# Patient Record
Sex: FEMALE | Race: BLACK OR AFRICAN AMERICAN | Employment: OTHER | URBAN - METROPOLITAN AREA
[De-identification: names, ages, dates, MRNs, and addresses within clinical notes are randomized per-mention and may not be internally consistent; named-entity substitution may affect disease eponyms.]

---

## 2017-09-24 ENCOUNTER — APPOINTMENT (OUTPATIENT)
Dept: CT IMAGING | Facility: CLINIC | Age: 42
End: 2017-09-24
Attending: EMERGENCY MEDICINE
Payer: COMMERCIAL

## 2017-09-24 ENCOUNTER — HOSPITAL ENCOUNTER (EMERGENCY)
Facility: CLINIC | Age: 42
Discharge: HOME | End: 2017-09-24
Attending: EMERGENCY MEDICINE | Admitting: EMERGENCY MEDICINE
Payer: COMMERCIAL

## 2017-09-24 VITALS
TEMPERATURE: 98 F | RESPIRATION RATE: 18 BRPM | DIASTOLIC BLOOD PRESSURE: 85 MMHG | OXYGEN SATURATION: 97 % | SYSTOLIC BLOOD PRESSURE: 125 MMHG

## 2017-09-24 DIAGNOSIS — R07.0 THROAT PAIN: ICD-10-CM

## 2017-09-24 DIAGNOSIS — R07.89 ATYPICAL CHEST PAIN: ICD-10-CM

## 2017-09-24 PROBLEM — R10.2 CHRONIC PELVIC PAIN IN FEMALE: Status: ACTIVE | Noted: 2017-03-06

## 2017-09-24 PROBLEM — G89.29 CHRONIC PELVIC PAIN IN FEMALE: Status: ACTIVE | Noted: 2017-03-06

## 2017-09-24 PROBLEM — N97.9 FEMALE INFERTILITY: Status: ACTIVE | Noted: 2017-02-28

## 2017-09-24 LAB
ANION GAP SERPL CALCULATED.3IONS-SCNC: 5 MMOL/L (ref 3–14)
BASOPHILS # BLD AUTO: 0 10E9/L (ref 0–0.2)
BASOPHILS NFR BLD AUTO: 0.2 %
BUN SERPL-MCNC: 10 MG/DL (ref 7–30)
CALCIUM SERPL-MCNC: 8.4 MG/DL (ref 8.5–10.1)
CHLORIDE SERPL-SCNC: 106 MMOL/L (ref 94–109)
CO2 SERPL-SCNC: 26 MMOL/L (ref 20–32)
CREAT SERPL-MCNC: 0.54 MG/DL (ref 0.52–1.04)
D DIMER PPP FEU-MCNC: 0.4 UG/ML FEU (ref 0–0.5)
DIFFERENTIAL METHOD BLD: NORMAL
EOSINOPHIL # BLD AUTO: 0 10E9/L (ref 0–0.7)
EOSINOPHIL NFR BLD AUTO: 0 %
ERYTHROCYTE [DISTWIDTH] IN BLOOD BY AUTOMATED COUNT: 12.7 % (ref 10–15)
GFR SERPL CREATININE-BSD FRML MDRD: >90 ML/MIN/1.7M2
GLUCOSE SERPL-MCNC: 146 MG/DL (ref 70–99)
HCT VFR BLD AUTO: 38.8 % (ref 35–47)
HGB BLD-MCNC: 12.9 G/DL (ref 11.7–15.7)
IMM GRANULOCYTES # BLD: 0 10E9/L (ref 0–0.4)
IMM GRANULOCYTES NFR BLD: 0.6 %
LYMPHOCYTES # BLD AUTO: 0.9 10E9/L (ref 0.8–5.3)
LYMPHOCYTES NFR BLD AUTO: 13.1 %
MCH RBC QN AUTO: 31.2 PG (ref 26.5–33)
MCHC RBC AUTO-ENTMCNC: 33.2 G/DL (ref 31.5–36.5)
MCV RBC AUTO: 94 FL (ref 78–100)
MONOCYTES # BLD AUTO: 0.1 10E9/L (ref 0–1.3)
MONOCYTES NFR BLD AUTO: 1.8 %
NEUTROPHILS # BLD AUTO: 5.5 10E9/L (ref 1.6–8.3)
NEUTROPHILS NFR BLD AUTO: 84.3 %
NRBC # BLD AUTO: 0 10*3/UL
NRBC BLD AUTO-RTO: 0 /100
PLATELET # BLD AUTO: 191 10E9/L (ref 150–450)
POTASSIUM SERPL-SCNC: 3.7 MMOL/L (ref 3.4–5.3)
RBC # BLD AUTO: 4.14 10E12/L (ref 3.8–5.2)
SODIUM SERPL-SCNC: 137 MMOL/L (ref 133–144)
TROPONIN I SERPL-MCNC: <0.015 UG/L (ref 0–0.04)
WBC # BLD AUTO: 6.5 10E9/L (ref 4–11)

## 2017-09-24 PROCEDURE — 99285 EMERGENCY DEPT VISIT HI MDM: CPT | Mod: 25

## 2017-09-24 PROCEDURE — 96361 HYDRATE IV INFUSION ADD-ON: CPT

## 2017-09-24 PROCEDURE — 84484 ASSAY OF TROPONIN QUANT: CPT | Performed by: EMERGENCY MEDICINE

## 2017-09-24 PROCEDURE — 85379 FIBRIN DEGRADATION QUANT: CPT | Performed by: EMERGENCY MEDICINE

## 2017-09-24 PROCEDURE — 25000130 H RX MED GY IP 250 OP 259 PS 637: Performed by: EMERGENCY MEDICINE

## 2017-09-24 PROCEDURE — 71260 CT THORAX DX C+: CPT

## 2017-09-24 PROCEDURE — 85025 COMPLETE CBC W/AUTO DIFF WBC: CPT | Performed by: EMERGENCY MEDICINE

## 2017-09-24 PROCEDURE — 25000128 H RX IP 250 OP 636: Performed by: EMERGENCY MEDICINE

## 2017-09-24 PROCEDURE — 80048 BASIC METABOLIC PNL TOTAL CA: CPT | Performed by: EMERGENCY MEDICINE

## 2017-09-24 PROCEDURE — 96374 THER/PROPH/DIAG INJ IV PUSH: CPT | Mod: 59

## 2017-09-24 PROCEDURE — 93005 ELECTROCARDIOGRAM TRACING: CPT

## 2017-09-24 RX ORDER — IOPAMIDOL 755 MG/ML
500 INJECTION, SOLUTION INTRAVASCULAR ONCE
Status: COMPLETED | OUTPATIENT
Start: 2017-09-24 | End: 2017-09-24

## 2017-09-24 RX ORDER — SODIUM CHLORIDE 9 MG/ML
1000 INJECTION, SOLUTION INTRAVENOUS CONTINUOUS
Status: DISCONTINUED | OUTPATIENT
Start: 2017-09-24 | End: 2017-09-25 | Stop reason: HOSPADM

## 2017-09-24 RX ORDER — IBUPROFEN 200 MG
400 TABLET ORAL EVERY 8 HOURS PRN
Qty: 60 TABLET | Refills: 0 | Status: SHIPPED | OUTPATIENT
Start: 2017-09-24 | End: 2018-03-13

## 2017-09-24 RX ORDER — SODIUM CHLORIDE 9 MG/ML
INJECTION, SOLUTION INTRAVENOUS
Status: DISCONTINUED
Start: 2017-09-24 | End: 2017-09-25 | Stop reason: HOSPADM

## 2017-09-24 RX ORDER — KETOROLAC TROMETHAMINE 15 MG/ML
15 INJECTION, SOLUTION INTRAMUSCULAR; INTRAVENOUS ONCE
Status: COMPLETED | OUTPATIENT
Start: 2017-09-24 | End: 2017-09-24

## 2017-09-24 RX ADMIN — SODIUM CHLORIDE 1000 ML: 9 INJECTION, SOLUTION INTRAVENOUS at 21:08

## 2017-09-24 RX ADMIN — KETOROLAC TROMETHAMINE 15 MG: 15 INJECTION, SOLUTION INTRAMUSCULAR; INTRAVENOUS at 21:18

## 2017-09-24 RX ADMIN — SODIUM CHLORIDE 80 ML: 9 INJECTION, SOLUTION INTRAVENOUS at 22:12

## 2017-09-24 RX ADMIN — Medication 10 MG: at 23:20

## 2017-09-24 RX ADMIN — IOPAMIDOL 66 ML: 755 INJECTION, SOLUTION INTRAVENOUS at 22:12

## 2017-09-24 ASSESSMENT — ENCOUNTER SYMPTOMS
CHEST TIGHTNESS: 1
SHORTNESS OF BREATH: 1
APPETITE CHANGE: 1
SORE THROAT: 1
FEVER: 0
VOICE CHANGE: 1
ABDOMINAL PAIN: 1
TROUBLE SWALLOWING: 1

## 2017-09-24 NOTE — ED AVS SNAPSHOT
[facility] Emergency Department    [address] [facility] [address]    Phone:  [phone]    Fax:  [phone]                                       Pili Du   MRN: [id number]    Department:  [facility] Emergency Department   Date of Visit:  9/24/2017           Patient Information     Date Of Birth          1975        Your diagnoses for this visit were:     Atypical chest pain     Throat pain        You were seen by Diana La MD.      Follow-up Information     Follow up with [facility] Emergency Department.    Specialty:  EMERGENCY MEDICINE    Why:  immediately , If symptoms worsen    Contact information:    [address]  [facility] [address] [phone]        Follow up with Colin, Tito Berrios In 3 days.    Why:  for a recheck of your symptoms and your blood sugar (slightly elevated in the ED)    Contact information:    [address] [place] [address]  [phone]          Discharge Instructions       Discharge Instructions  Chest Pain    You have been seen today for chest pain or discomfort.  At this time, your doctor has found no signs that your chest pain is due to a serious or life-threatening condition, (or you have declined more testing and/or admission to the hospital). However, sometimes there is a serious problem that does not show up right away. Your evaluation today may not be complete and you may need further testing and evaluation.     You need to follow-up with your regular doctor within 3 days.    Return to the Emergency Department if:    Your chest pain changes, gets worse, starts to happen more often, or comes with less activity.    You are short of breath.    You get very weak or tired.    You pass out or faint.    You have any new symptoms, like fever, cough, numb legs, or you cough up blood.    You have anything else that worries you.    Until you follow-up with your  regular doctor please do the following:    Take one aspirin daily unless you have an allergy or are told not to by your doctor.    If a stress test appointment has been made, go to the appointment.    If you have questions, contact your regular doctor.    If your doctor today has told you to follow-up with your regular doctor, it is very important that you make an appointment with your clinic and go to the appointment.  If you do not follow-up with your primary doctor, it may result in missing an important development which could result in permanent injury or disability and/or lasting pain.  If there is any problem keeping your appointment, call your doctor or return to the Emergency Department.    If you were given a prescription for medicine here today, be sure to read all of the information (including the package insert) that comes with your prescription.  This will include important information about the medicine, its side effects, and any warnings that you need to know about.  The pharmacist who fills the prescription can provide more information and answer questions you may have about the medicine.  If you have questions or concerns that the pharmacist cannot address, please call or return to the Emergency Department.         24 Hour Appointment Hotline       To make an appointment at any [facility], call [phone] ([phone]). If you don't have a family doctor or clinic, we will help you find one. [place] clinics are conveniently located to serve the needs of you and your family.             Review of your medicines      Our records show that you are taking the medicines listed below. If these are incorrect, please call your family doctor or clinic.        Dose / Directions Last dose taken    cholecalciferol 5000 UNITS Caps   Dose:  1 capsule   Quantity:  100 capsule        Take 1 capsule (5,000 Units) by mouth daily FOR VITAMIN D DEFICIENCY (LOW VITAMIN D),TAKE 2 CAPSULES DAILY FOR THE FIRST  2 WEEKS, THEN 1 CAPSULE DAILY   Refills:  3        ferrous sulfate Dried 140 (45 FE) MG Tbcr   Dose:  1 tablet   Quantity:  180 tablet        Take 1 tablet by mouth 2 times daily (before meals) IRON SUPPLEMENT - PLEASE TAKE WITH 4 OZ OF ORANGE JUICE WITH PULP, SUBSTITUTION OF IRON SUPPLEMENT PERMITTED   Refills:  PRN        ibuprofen 600 MG tablet   Commonly known as:  ADVIL/MOTRIN   Dose:  600 mg   Quantity:  30 tablet        Take 1 tablet (600 mg) by mouth every 6 hours as needed for moderate pain   Refills:  0        SYNTHROID PO   Dose:  150 mcg        Take 150 mcg by mouth daily   Refills:  0        vitamin C-electrolytes 1000mg vitamin C super orange drink mix   Commonly known as:  EMERGEN-C   Quantity:  90 packet        Mix 1 packet in 4-6oz water and take once daily take with iron supplement, INDICATION: VITAMIN C SUPPLEMENTION   Refills:  PRN                Procedures and tests performed during your visit     Basic metabolic panel    CBC with platelets differential    Chest CT, IV contrast only - PE protocol    D dimer quantitative    EKG 12-lead, tracing only    Peripheral IV: Standard    Troponin I      Orders Needing Specimen Collection     None      Pending Results     Date and Time Order Name Status Description    9/24/2017 2044 EKG 12-lead, tracing only Preliminary             Pending Culture Results     No orders found from 9/22/2017 to 9/25/2017.            Pending Results Instructions     If you had any lab results that were not finalized at the time of your Discharge, you can call the ED Lab Result RN at [phone]. You will be contacted by this team for any positive Lab results or changes in treatment. The nurses are available 7 days a week from 10A to 6:30P.  You can leave a message 24 hours per day and they will return your call.        Test Results From Your Hospital Stay        9/24/2017  9:29 PM      Component Results     Component Value Ref Range & Units Status    WBC 6.5 4.0 - 11.0  10e9/L Final    RBC Count 4.14 3.8 - 5.2 10e12/L Final    Hemoglobin 12.9 11.7 - 15.7 g/dL Final    Hematocrit 38.8 35.0 - 47.0 % Final    MCV 94 78 - 100 fl Final    MCH 31.2 26.5 - 33.0 pg Final    MCHC 33.2 31.5 - 36.5 g/dL Final    RDW 12.7 10.0 - 15.0 % Final    Platelet Count 191 150 - 450 10e9/L Final    Diff Method Automated Method  Final    % Neutrophils 84.3 % Final    % Lymphocytes 13.1 % Final    % Monocytes 1.8 % Final    % Eosinophils 0.0 % Final    % Basophils 0.2 % Final    % Immature Granulocytes 0.6 % Final    Nucleated RBCs 0 0 /100 Final    Absolute Neutrophil 5.5 1.6 - 8.3 10e9/L Final    Absolute Lymphocytes 0.9 0.8 - 5.3 10e9/L Final    Absolute Monocytes 0.1 0.0 - 1.3 10e9/L Final    Absolute Eosinophils 0.0 0.0 - 0.7 10e9/L Final    Absolute Basophils 0.0 0.0 - 0.2 10e9/L Final    Abs Immature Granulocytes 0.0 0 - 0.4 10e9/L Final    Absolute Nucleated RBC 0.0  Final         9/24/2017  9:50 PM      Component Results     Component Value Ref Range & Units Status    Sodium 137 133 - 144 mmol/L Final    Potassium 3.7 3.4 - 5.3 mmol/L Final    Chloride 106 94 - 109 mmol/L Final    Carbon Dioxide 26 20 - 32 mmol/L Final    Anion Gap 5 3 - 14 mmol/L Final    Glucose 146 (H) 70 - 99 mg/dL Final    Urea Nitrogen 10 7 - 30 mg/dL Final    Creatinine 0.54 0.52 - 1.04 mg/dL Final    GFR Estimate >90 >60 mL/min/1.7m2 Final    Non  GFR Calc    GFR Estimate If Black >90 >60 mL/min/1.7m2 Final    African American GFR Calc    Calcium 8.4 (L) 8.5 - 10.1 mg/dL Final         9/24/2017  9:43 PM      Component Results     Component Value Ref Range & Units Status    D Dimer 0.4 0.0 - 0.50 ug/ml FEU Final    This D-dimer assay is intended for use in conjunction with a clinical pretest   probability assessment model to exclude pulmonary embolism (PE) and deep   venous thrombosis (DVT) in outpatients suspected of PE or DVT. The cut-off   value is 0.5 ug/mL FEU.           9/24/2017  9:50 PM       Component Results     Component Value Ref Range & Units Status    Troponin I ES <0.015 0.000 - 0.045 ug/L Final    The 99th percentile for upper reference range is 0.045 ug/L.  Troponin values   in the range of 0.045 - 0.120 ug/L may be associated with risks of adverse   clinical events.           9/24/2017 10:49 PM      Narrative     CT CHEST PULMONARY EMBOLISM WITH CONTRAST 9/24/2017 10:21 PM     HISTORY: Right-sided chest pain, shortness of breath, forceful  vomiting 3 days ago.    CONTRAST DOSE:  66 mL Isovue-370    Radiation dose for this scan was reduced using automated exposure  control, adjustment of the mA and/or kV according to patient size, or  iterative reconstruction technique.    FINDINGS:  There is a good contrast bolus within the pulmonary  arteries. There are no pulmonary arterial filling defects to indicate  pulmonary embolism. Contrast enhancement within the aorta is  suboptimal with only a small amount of contrast present. Nonetheless,  there is no apparent aortic dissection. The mediastinum and mateo are  otherwise unremarkable. There is a 0.2 cm right lower lobe pulmonary  nodule on image 70, unchanged from 7/26/2012 and therefore benign. The  lungs are otherwise clear. No pleural effusion or pneumothorax.  Cholecystectomy surgical clips are noted.        Impression     IMPRESSION: No CT evidence of pulmonary embolism.    CATRINA GARCIA MD                Clinical Quality Measure: Blood Pressure Screening     Your blood pressure was checked while you were in the emergency department today. The last reading we obtained was  BP: (!) 124/92 . Please read the guidelines below about what these numbers mean and what you should do about them.  If your systolic blood pressure (the top number) is less than 120 and your diastolic blood pressure (the bottom number) is less than 80, then your blood pressure is normal. There is nothing more that you need to do about it.  If your systolic blood pressure (the  "top number) is 120-139 or your diastolic blood pressure (the bottom number) is 80-89, your blood pressure may be higher than it should be. You should have your blood pressure rechecked within a year by a primary care provider.  If your systolic blood pressure (the top number) is 140 or greater or your diastolic blood pressure (the bottom number) is 90 or greater, you may have high blood pressure. High blood pressure is treatable, but if left untreated over time it can put you at risk for heart attack, stroke, or kidney failure. You should have your blood pressure rechecked by a primary care provider within the next 4 weeks.  If your provider in the emergency department today gave you specific instructions to follow-up with your doctor or provider even sooner than that, you should follow that instruction and not wait for up to 4 weeks for your follow-up visit.        Thank you for choosing [facility]       Thank you for choosing [facility] for your care. Our goal is always to provide you with excellent care. Hearing back from our patients is one way we can continue to improve our services. Please take a few minutes to complete the written survey that you may receive in the mail after you visit with us. Thank you!        [employer] Information     [employer] lets you send messages to your doctor, view your test results, renew your prescriptions, schedule appointments and more. To sign up, go to [email or web address] . Click on \"Log in\" on the left side of the screen, which will take you to the Welcome page. Then click on \"Sign up Now\" on the right side of the page.     You will be asked to enter the access code listed below, as well as some personal information. Please follow the directions to create your username and password.     Your access code is: [email or web address]  Expires: 2017 11:24 PM     Your access code will  in 90 days. If you need help or a new code, please call your [facility] clinic or [phone].      "   Care EveryWhere ID     This is your Care EveryWhere ID. This could be used by other organizations to access your [facility] medical records  [id number]        Equal Access to Services     TALA ALMODOVAR : Johnathan Fontenot, cas sahu, shweta matthews, mercedes goldstein. So [facility] [phone].    ATENCIÓN: Si habla español, tiene a blum disposición servicios gratuitos de asistencia lingüística. Llame al [phone].    We comply with applicable federal civil rights laws and [place] laws. We do not discriminate on the basis of race, color, national origin, age, disability sex, sexual orientation or gender identity.            After Visit Summary       This is your record. Keep this with you and show to your community pharmacist(s) and doctor(s) at your next visit.

## 2017-09-24 NOTE — ED AVS SNAPSHOT
[facility] Emergency Department    [address]    [facility] [address]    Phone:  [phone]    Fax:  [phone]                                       Pili Du   MRN: [id number]    Department:  [facility] Emergency Department   Date of Visit:  9/24/2017           After Visit Summary Signature Page     I have received my discharge instructions, and my questions have been answered. I have discussed any challenges I see with this plan with the nurse or doctor.    ..........................................................................................................................................  Patient/Patient Representative Signature      ..........................................................................................................................................  Patient Representative Print Name and Relationship to Patient    ..................................................               ................................................  Date                                            Time    ..........................................................................................................................................  Reviewed by Signature/Title    ...................................................              ..............................................  Date                                                            Time

## 2017-09-25 LAB — INTERPRETATION ECG - MUSE: NORMAL

## 2017-09-25 NOTE — ED NOTES
Patient reports feeling SOB and having some tightness in her upper right chest and throat. Patient states she had heartburn 2 days ago and made herself vomit. Patient states that she was seen at urgent care yesterday and received a breathing treatment which didn't help.

## 2017-09-25 NOTE — ED PROVIDER NOTES
"  History     Chief Complaint:  Shortness of Breath      HPI   Pili Du is a 42 year old female who presents to the emergency department today for evaluation of shortness of breath. The patient reports that 4 days ago after eating breakfast she had an episode of vomiting followed by chest and throat tightness. She tried to burp but was unable and took gas medication without relief. Since then, she has felt \"heavy\" in her throat and right side of chest, therefore she went to urgent care yesterday and received an chest radiograph and neb with no relief.  She describes her chest pain as \"tightness.\"  The chest radiograph was reportedly negative, and strep swab was also negative.  Today, symptoms are persistent, therefore she presents to the emergency department for evaluation. Associated symptoms included shortness of breath, decreased appetite, abdominal pain, and voice change.  She denies pain with swallowing. There are no aggravating factors. She denies fever and pain with swallowing. Of note, she took ibuprofen four hours prior to arrival with little relief.    Allergies:  [place] aspirin     Medications:    cholecalciferol 5000 UNITS CAPS  vitamin C-electrolytes (EMERGEN-C) 1000mg vitamin C super orange drink mix  ferrous sulfate Dried 140 (45 FE) MG TBCR  Levothyroxine Sodium (SYNTHROID PO)    Past Medical History:    Abnormal Pap smear  Anemia  Hypothyroidism  Endometriosis  GERD    Past Surgical History:    Laparoscopic cholecystectomy    Family History:    History reviewed. No pertinent family history.     Social History:  The patient was unaccompanied to the ED.  Smoking Status: never  Smokeless Tobacco: never  Alcohol Use: no  Marital Status:       Review of Systems   Constitutional: Positive for appetite change (decreased). Negative for fever.   HENT: Positive for sore throat (throat tightness), trouble swallowing (denies pain) and voice change.    Respiratory: Positive for chest tightness and " shortness of breath.    Gastrointestinal: Positive for abdominal pain.   All other systems reviewed and are negative.    Physical Exam   First Vitals:  BP: (!) 130/97  Heart Rate: 99  Temp: 98  F (36.7  C)  Resp: 20  SpO2: 100 %      Physical Exam  Gen: Pleasant, appears stated age.  Well appearing    Eye:   Pupils are equal, round, and reactive.     Sclera non-injected.    ENT:   Moist mucus membranes.     Normal tongue.    Oropharynx without lesions.  Posterior OP slightly erythematous, no exudate.   Voice slightly hoarse.   Mild pain with compression of tracheal cartilage.   No drooling or stridor.   Full flexion and extension of the neck.   No anterior lymphadenopathy.   Mild tenderness lateral to trachea.  No masses.   Uvula midline.    Cardiac:     Normal rate and regular rhythm.    No murmurs, gallops, or rubs.    Pulmonary:     Clear to auscultation bilaterally.    No wheezes, rales, or rhonchi.   Breathing unlabored when lying at 20 degrees.    Abdomen:     Normal active bowel sounds.     Abdomen is soft and non-distended.   Mild diffuse abdominal pain.    Musculoskeletal:     Normal movement of all extremities without evidence for deficit.   Pain reproducible with palpation over right chest wall.    Extremities:    No edema.   2+ radial pulses.   Calves are nontender.    Skin:   Warm and dry.    Neurologic:    Non-focal exam without asymmetric weakness or numbness.    Normal tone    Psychiatric:     Normal affect with appropriate interaction with examiner.    Emergency Department Course     ECG:  Indication: shortness of breath   Completed at 2202.  Read at 2206.   Sinus bradycardia  Otherwise normal ECG   Rate 57 bpm. TX interval 152. QRS duration 80. QT/QTc 430/418. P-R-T axes 61 61 39.    Imaging:  Radiology findings were communicated with the patient who voiced understanding of the findings.  Chest CT, IV contrast only - PE protocol  IMPRESSION: No CT evidence of pulmonary embolism.  Report per  radiology     Laboratory:  Laboratory findings were communicated with the patient who voiced understanding of the findings.  CBC: AWNL. (WBC 6.5, HGB 12.9, )   BMP: Glucose 146 (H), Calcium 8.4 (L) o/w WNL (Creatinine 0.54)  D Dimer (Collected 2108): 0.4  Troponin (Collected 2108): <0.015    Interventions:  2110 NS Bolus 1,000mL IV  2118 Toradol 30 mg IV     Emergency Department Course:  Nursing notes and vitals reviewed.  IV was inserted and blood was drawn for laboratory testing, results above.  The patient was sent for a Chest CT, IV contrast only - PE protocol while in the emergency department, results above.   2037: I performed an exam of the patient as documented above.   2248: Patient rechecked and updated.  She continues to be well appearing without any increased work of breathing.  I discussed the treatment plan with the patient. They expressed understanding of this plan and consented to discharge. They will be discharged home with instructions for care and follow up. In addition, the patient will return to the emergency department if their symptoms persist, worsen, if new symptoms arise or if there is any concern.  All questions were answered.  I personally reviewed the laboratory and imaging results with the Patient and answered all related questions prior to discharge.    Impression & Plan      Medical Decision Making:  Pili Du is a 42 year old female who presents complaining of chest tightness and neck pain after induced vomiting 3 days ago. Symptoms have been constant for the last 3 days. Differential diagnosis was broad. Given the symptoms of tightness, I considered acute coronary syndrome. Fortunately, ECG is negative for any signs of acute ischemia. Troponin is negative after 3 days of constant pain. D dimer was negative, though CT of the chest was obtained. This was because I was also concerned about possible small esophageal rupture given that symptoms seemed to start following  induced vomiting. Fortunately, this is negative for any acute process including pulmonary embolism, aortic dissection, pneumonia, or other dangerous process. Her exam is not consistent with epiglottitis, peritonsillar abscess, retropharyngeal abscess, Dejan's angina, or Lemierre syndrome.  Airway is not compromised.  Decadron was provided for symptom relief.  Overall, she is well appearing with normal vitals and clear lungs. At this point, I think she is safe to be discharged home to continue symptomatic management. She will return to the emergency department for worsening pain, fever, or for other concerns.    Diagnosis:    ICD-10-CM    1. Atypical chest pain R07.89    2. Throat pain R07.0        Disposition:  Discharged to home    Scribe Disclosure:  I, Laure Bill, am serving as a scribe at 8:46 PM on 9/24/2017 to document services personally performed by Diana La MD based on my observations and the provider's statements to me.   9/24/2017   [facility] EMERGENCY DEPARTMENT       Diana La MD  09/25/17 4718

## 2017-12-12 ENCOUNTER — HOSPITAL ENCOUNTER (OUTPATIENT)
Dept: LAB | Facility: CLINIC | Age: 42
Discharge: HOME | End: 2017-12-12
Attending: OBSTETRICS & GYNECOLOGY | Admitting: OBSTETRICS & GYNECOLOGY
Payer: COMMERCIAL

## 2017-12-12 DIAGNOSIS — R53.81 DEBILITY: ICD-10-CM

## 2017-12-12 DIAGNOSIS — N94.10 FEMALE DYSPAREUNIA: ICD-10-CM

## 2017-12-12 DIAGNOSIS — Z13.29 SCREENING FOR THYROID DISORDER: ICD-10-CM

## 2017-12-12 DIAGNOSIS — R10.0 ACUTE ABDOMINAL PAIN SYNDROME: Primary | ICD-10-CM

## 2017-12-12 LAB
ALBUMIN SERPL-MCNC: 3.4 G/DL (ref 3.4–5)
ALP SERPL-CCNC: 98 U/L (ref 40–150)
ALT SERPL W P-5'-P-CCNC: 21 U/L (ref 0–50)
ANION GAP SERPL CALCULATED.3IONS-SCNC: 8 MMOL/L (ref 3–14)
AST SERPL W P-5'-P-CCNC: 25 U/L (ref 0–45)
BILIRUB SERPL-MCNC: 0.3 MG/DL (ref 0.2–1.3)
BUN SERPL-MCNC: 10 MG/DL (ref 7–30)
CALCIUM SERPL-MCNC: 8.5 MG/DL (ref 8.5–10.1)
CHLORIDE SERPL-SCNC: 103 MMOL/L (ref 94–109)
CO2 SERPL-SCNC: 27 MMOL/L (ref 20–32)
CREAT SERPL-MCNC: 0.54 MG/DL (ref 0.52–1.04)
DEPRECATED CALCIDIOL+CALCIFEROL SERPL-MC: 36 UG/L (ref 20–75)
ERYTHROCYTE [DISTWIDTH] IN BLOOD BY AUTOMATED COUNT: 12.8 % (ref 10–15)
FOLATE SERPL-MCNC: 12.5 NG/ML
GFR SERPL CREATININE-BSD FRML MDRD: >90 ML/MIN/1.7M2
GLUCOSE SERPL-MCNC: 82 MG/DL (ref 70–99)
HCT VFR BLD AUTO: 41.5 % (ref 35–47)
HGB BLD-MCNC: 13.7 G/DL (ref 11.7–15.7)
MCH RBC QN AUTO: 31 PG (ref 26.5–33)
MCHC RBC AUTO-ENTMCNC: 33 G/DL (ref 31.5–36.5)
MCV RBC AUTO: 94 FL (ref 78–100)
PLATELET # BLD AUTO: 207 10E9/L (ref 150–450)
POTASSIUM SERPL-SCNC: 3.5 MMOL/L (ref 3.4–5.3)
PROT SERPL-MCNC: 8 G/DL (ref 6.8–8.8)
RBC # BLD AUTO: 4.42 10E12/L (ref 3.8–5.2)
SODIUM SERPL-SCNC: 138 MMOL/L (ref 133–144)
T4 FREE SERPL-MCNC: 0.93 NG/DL (ref 0.76–1.46)
TSH SERPL DL<=0.005 MIU/L-ACNC: 4.34 MU/L (ref 0.4–4)
VIT B12 SERPL-MCNC: 950 PG/ML (ref 193–986)
WBC # BLD AUTO: 4.7 10E9/L (ref 4–11)

## 2017-12-12 PROCEDURE — 84439 ASSAY OF FREE THYROXINE: CPT | Performed by: OBSTETRICS & GYNECOLOGY

## 2017-12-12 PROCEDURE — 80053 COMPREHEN METABOLIC PANEL: CPT | Performed by: OBSTETRICS & GYNECOLOGY

## 2017-12-12 PROCEDURE — 82746 ASSAY OF FOLIC ACID SERUM: CPT | Performed by: OBSTETRICS & GYNECOLOGY

## 2017-12-12 PROCEDURE — 36415 COLL VENOUS BLD VENIPUNCTURE: CPT | Performed by: OBSTETRICS & GYNECOLOGY

## 2017-12-12 PROCEDURE — 85027 COMPLETE CBC AUTOMATED: CPT | Performed by: OBSTETRICS & GYNECOLOGY

## 2017-12-12 PROCEDURE — 84443 ASSAY THYROID STIM HORMONE: CPT | Performed by: OBSTETRICS & GYNECOLOGY

## 2017-12-12 PROCEDURE — 82306 VITAMIN D 25 HYDROXY: CPT | Performed by: OBSTETRICS & GYNECOLOGY

## 2017-12-12 PROCEDURE — 82607 VITAMIN B-12: CPT | Performed by: OBSTETRICS & GYNECOLOGY

## 2017-12-14 ENCOUNTER — HOSPITAL ENCOUNTER (OUTPATIENT)
Dept: ULTRASOUND IMAGING | Facility: CLINIC | Age: 42
Discharge: HOME | End: 2017-12-14
Attending: OBSTETRICS & GYNECOLOGY | Admitting: OBSTETRICS & GYNECOLOGY
Payer: COMMERCIAL

## 2017-12-14 DIAGNOSIS — R10.2 PELVIC PAIN: ICD-10-CM

## 2017-12-14 PROCEDURE — 76830 TRANSVAGINAL US NON-OB: CPT

## 2017-12-29 ENCOUNTER — HOSPITAL ENCOUNTER (OUTPATIENT)
Dept: LAB | Facility: CLINIC | Age: 42
Discharge: HOME | End: 2017-12-29
Attending: OBSTETRICS & GYNECOLOGY | Admitting: OBSTETRICS & GYNECOLOGY
Payer: COMMERCIAL

## 2017-12-29 DIAGNOSIS — Z13.29 SCREENING FOR THYROID DISORDER: ICD-10-CM

## 2017-12-29 DIAGNOSIS — E03.9 MYXEDEMA HEART DISEASE: Primary | ICD-10-CM

## 2017-12-29 DIAGNOSIS — I51.9 MYXEDEMA HEART DISEASE: Primary | ICD-10-CM

## 2017-12-29 DIAGNOSIS — R10.0 ACUTE ABDOMINAL PAIN SYNDROME: ICD-10-CM

## 2017-12-29 LAB
ESTRADIOL SERPL-MCNC: 52 PG/ML
FSH SERPL-ACNC: 12.5 IU/L
LH SERPL-ACNC: 6.7 IU/L

## 2017-12-29 PROCEDURE — 83001 ASSAY OF GONADOTROPIN (FSH): CPT | Performed by: OBSTETRICS & GYNECOLOGY

## 2017-12-29 PROCEDURE — 82670 ASSAY OF TOTAL ESTRADIOL: CPT | Performed by: OBSTETRICS & GYNECOLOGY

## 2017-12-29 PROCEDURE — 83520 IMMUNOASSAY QUANT NOS NONAB: CPT | Performed by: OBSTETRICS & GYNECOLOGY

## 2017-12-29 PROCEDURE — 83002 ASSAY OF GONADOTROPIN (LH): CPT | Performed by: OBSTETRICS & GYNECOLOGY

## 2017-12-29 PROCEDURE — 36415 COLL VENOUS BLD VENIPUNCTURE: CPT | Performed by: OBSTETRICS & GYNECOLOGY

## 2018-01-01 LAB — MIS SERPL-MCNC: 0.05 NG/ML

## 2018-01-19 ENCOUNTER — TRANSFERRED RECORDS (OUTPATIENT)
Dept: HEALTH INFORMATION MANAGEMENT | Facility: CLINIC | Age: 43
End: 2018-01-19

## 2018-02-09 ENCOUNTER — HOSPITAL ENCOUNTER (OUTPATIENT)
Dept: CT IMAGING | Facility: CLINIC | Age: 43
Discharge: HOME | End: 2018-02-09
Attending: PHYSICIAN ASSISTANT | Admitting: PHYSICIAN ASSISTANT
Payer: COMMERCIAL

## 2018-02-09 DIAGNOSIS — R10.13 EPIGASTRIC ABDOMINAL PAIN: ICD-10-CM

## 2018-02-09 PROCEDURE — 74177 CT ABD & PELVIS W/CONTRAST: CPT

## 2018-02-09 PROCEDURE — 25000128 H RX IP 250 OP 636: Performed by: RADIOLOGY

## 2018-02-09 RX ORDER — IOPAMIDOL 755 MG/ML
500 INJECTION, SOLUTION INTRAVASCULAR ONCE
Status: COMPLETED | OUTPATIENT
Start: 2018-02-09 | End: 2018-02-09

## 2018-02-09 RX ADMIN — IOPAMIDOL 85 ML: 755 INJECTION, SOLUTION INTRAVENOUS at 10:45

## 2018-02-09 RX ADMIN — SODIUM CHLORIDE 43 ML: 9 INJECTION, SOLUTION INTRAVENOUS at 10:45

## 2018-02-16 ENCOUNTER — MEDICAL CORRESPONDENCE (OUTPATIENT)
Dept: HEALTH INFORMATION MANAGEMENT | Facility: CLINIC | Age: 43
End: 2018-02-16

## 2018-02-22 ENCOUNTER — OFFICE VISIT (OUTPATIENT)
Dept: SURGERY | Facility: CLINIC | Age: 43
End: 2018-02-22
Payer: COMMERCIAL

## 2018-02-22 VITALS
SYSTOLIC BLOOD PRESSURE: 100 MMHG | TEMPERATURE: 98.2 F | HEART RATE: 75 BPM | BODY MASS INDEX: 31.43 KG/M2 | OXYGEN SATURATION: 100 % | DIASTOLIC BLOOD PRESSURE: 63 MMHG | HEIGHT: 63 IN | WEIGHT: 177.4 LBS

## 2018-02-22 DIAGNOSIS — K44.9 HIATAL HERNIA: Primary | ICD-10-CM

## 2018-02-22 DIAGNOSIS — K21.9 GASTROESOPHAGEAL REFLUX DISEASE, ESOPHAGITIS PRESENCE NOT SPECIFIED: ICD-10-CM

## 2018-02-22 RX ORDER — LANSOPRAZOLE 30 MG/1
CAPSULE, DELAYED RELEASE ORAL
COMMUNITY
Start: 2017-11-10 | End: 2018-03-13

## 2018-02-22 ASSESSMENT — PAIN SCALES - GENERAL: PAINLEVEL: NO PAIN (0)

## 2018-02-22 NOTE — MR AVS SNAPSHOT
After Visit Summary   2/22/2018    Pili Du    MRN: [id number]           Patient Information     Date Of Birth          1975        Visit Information        Provider Department      2/22/2018 10:00 AM Saran Baldwin MD [facility] Surgery        Today's Diagnoses     Acid reflux    -  1      Care Instructions    It was a pleasure meeting with you today.     Thank you for allowing us the privilege of caring for you. We hope we provided you with the excellent service you deserve.     Please let us know if there is anything else we can do for you so that we can be sure you are leaving completely satisfied with your care experience.      You saw Dr Baldwin today.     Instructions per today's visit:     Needs to be off acid blockers 7 days prior to procedure    Endoscopy Instructions:    -Call the Endoscopy Department at [phone] to schedule an endoscopy to be done by your surgeon.  Date:___________  Time:___________    -Have nothing to eat or drink for 6 hours prior to the check-in time.   -Check-in one hour before the procedure time.  -Please bring a  to drive you home after the procedure.  -You cannot drive for 24 hours after the procedure due to the sedation.  -The Endoscopy Department is located on the 1st floor of the [facility].  -The [facility] is at [address] [place], [place] [address].    -Ph: [phone] for scheduling, directions or questions.  - parking at the front door is available for the same price as parking in the [facility] visitors ramp.        To schedule appointments with our team, please call [phone] option #1    Please call during clinic hours Monday through Friday 8:00a - 4:00p if you have questions or you can contact us via [employer] at anytime.      Nurses: [phone] Option # 3 for nurse advice line.  Fax: [phone]  Surgery Scheduler: [phone]    Please call the [facility] at  "[phone] to speak with our on call MDs if you have urgent needs after hours, during weekends, or holidays.            Follow-ups after your visit        Future tests that were ordered for you today     Open Future Orders        Priority Expected Expires Ordered    EXAMEGD Routine 4/8/2018 4/8/2018 2/22/2018            Who to contact     Please call your clinic at [phone] to:    Ask questions about your health    Make or cancel appointments    Discuss your medicines    Learn about your test results    Speak to your doctor            Additional Information About Your Visit        Care EveryWhere ID     This is your Care EveryWhere ID. This could be used by other organizations to access your [facility] medical records  [id number]        Your Vitals Were     Pulse Temperature Height Pulse Oximetry BMI (Body Mass Index)       75 98.2  F (36.8  C) (Oral) 5' 3\" 100% 31.42 kg/m2        Blood Pressure from Last 3 Encounters:   02/22/18 100/63   09/24/17 125/85   10/28/16 106/66    Weight from Last 3 Encounters:   02/22/18 177 lb 6.4 oz   10/28/16 173 lb 8 oz   09/01/16 172 lb 13.5 oz               Primary Care Provider Office Phone # Fax #    [facility] [phone] [address] [place] [address]        Equal Access to Services     TALA ALMODOVAR AH: Hadii eliz ku hadasho Soomaali, waaxda luqadaha, qaybta kaalmada adeegyada, mercedes idiin hayjian steff goldstein. So [facility] [phone].    ATENCIÓN: Si habla español, tiene a blum disposición servicios gratuitos de asistencia lingüística. Llame al [phone].    We comply with applicable federal civil rights laws and [place] laws. We do not discriminate on the basis of race, color, national origin, age, disability, sex, sexual orientation, or gender identity.            Thank you!     Thank you for choosing [facility]  for your care. Our goal is always to provide you with excellent care. Hearing back " from our patients is one way we can continue to improve our services. Please take a few minutes to complete the written survey that you may receive in the mail after your visit with us. Thank you!             Your Updated Medication List - Protect others around you: Learn how to safely use, store and throw away your medicines at www.disposemymeds.org.          This list is accurate as of 2/22/18 10:36 AM.  Always use your most recent med list.                   Brand Name Dispense Instructions for use Diagnosis    cholecalciferol 5000 UNITS Caps     100 capsule    Take 1 capsule (5,000 Units) by mouth daily FOR VITAMIN D DEFICIENCY (LOW VITAMIN D),TAKE 2 CAPSULES DAILY FOR THE FIRST 2 WEEKS, THEN 1 CAPSULE DAILY    Other fatigue       ferrous sulfate Dried 140 (45 FE) MG Tbcr     180 tablet    Take 1 tablet by mouth 2 times daily (before meals) IRON SUPPLEMENT - PLEASE TAKE WITH 4 OZ OF ORANGE JUICE WITH PULP, SUBSTITUTION OF IRON SUPPLEMENT PERMITTED    Iron deficiency anemia, unspecified iron deficiency anemia type       ibuprofen 200 MG tablet    ADVIL/MOTRIN    60 tablet    Take 2 tablets (400 mg) by mouth every 8 hours as needed for pain        LANsoprazole 30 MG CR capsule    PREVACID          SYNTHROID PO      Take 150 mcg by mouth daily        vitamin C-electrolytes 1000mg vitamin C super orange drink mix    EMERGEN-C    90 packet    Mix 1 packet in 4-6oz water and take once daily take with iron supplement, INDICATION: VITAMIN C SUPPLEMENTION    Iron deficiency anemia, unspecified iron deficiency anemia type, Vitamin C deficiency

## 2018-02-22 NOTE — PROGRESS NOTES
New Hernia Consultation Note      Pili Du  [id number]  1975    Requesting Provider: Delmer Villaseñor    Dear Clinic, [facility],    JOHNIE was asked by Delmer Villaseñor to see this patient for the following problem:    CHIEF COMPLAINT: Reflux and small hiatal Hernia     HISTORY OF PRESENT ILLNESS:  Location: hiatal hernia     Patient is a 42 year old with history of GERD who presents to clinic for reflux symptoms. Patient reports she has been having significant reflux for a couple years, however, the past few months she started having epigastric pain. She also reports a feeling of heavy chest, belching, bloating, and gas. Pain is worse after meals, and she has resorted to self-induced vomiting to relieve symptoms. Patient did have a cardiac workup including CT (2/9/18) and EKG which was negative, except for a small hiatal hernia. H-pylori testing was also negative. Patient was an everyday smoker, however, she reports that she quit a couple of months ago, and currently only has occasional hookah use. Patient is on omeprazole and reports some relief with reflux symptoms using it. Patient had a cholecystectomy done in 2012.       NUTRITIONAL STATUS:  Lab Results   Component Value Date    ALBUMIN 3.4 12/12/2017       Body mass index is 31.42 kg/(m^2).    Patient is not immunosuppressed.    Patient is a current smoker.    Past Medical History:   Diagnosis Date     Abnormal Pap smear     2011 - resolved     Other specified anemias     takes iron     Unspecified hypothyroidism     takes synthroid       Patient Active Problem List   Diagnosis     Endometriosis of other specified sites     Status post induction of labor     Right sided abdominal pain     Vomiting     Nausea and vomiting in pregnancy     Decreased fetal movement     Indication for care in labor or delivery     Dizziness     IUGR (intrauterine growth restriction)     Normal spontaneous vaginal delivery     Sepsis (H)     Anemia      Fatigue     Thyroid activity decreased     Vitamin D deficiency     Thiamin deficiency     Acquired hypothyroidism     Vitamin B12 deficiency without anemia     Pyridoxine deficiency     Iron deficiency anemia     Incomplete      Amenorrhea     Chronic pelvic pain in female     Female infertility     Gastroesophageal reflux disease without esophagitis     Reflux esophagitis     Encounter for supervision of other normal pregnancy       Past Surgical History:   Procedure Laterality Date     LAPAROSCOPIC CHOLECYSTECTOMY  10/1/2012    Procedure: LAPAROSCOPIC CHOLECYSTECTOMY;  LAPAROSCOPIC CHOLECYSTECTOMY ;  Surgeon: Liyah Lowry MD;  Location:  OR       MEDICATIONS:  Current Outpatient Prescriptions   Medication     LANsoprazole (PREVACID) 30 MG CR capsule     cholecalciferol 5000 UNITS CAPS     vitamin C-electrolytes (EMERGEN-C) 1000mg vitamin C super orange drink mix     ferrous sulfate Dried 140 (45 FE) MG TBCR     Levothyroxine Sodium (SYNTHROID PO)     ibuprofen (ADVIL/MOTRIN) 200 MG tablet     No current facility-administered medications for this visit.        ALLERGIES:  Allergies   Allergen Reactions     Kenyon Aspirin Swelling     Patient reports allergy to ASPIRIN       Social History     Social History     Marital status:      Spouse name: N/A     Number of children: N/A     Years of education: N/A     Social History Main Topics     Smoking status: History of everyday smoking, quit , now occasional hookah use     Smokeless tobacco: Never Used     Alcohol use No     Drug use: No     Sexual activity: Yes     Partners: Male     Other Topics Concern     None     Social History Narrative       Family History   Problem Relation Age of Onset     Family History Negative No family hx of        ROS  A 10 point ROS was negative unless otherwise stated in HPI.     Orders Placed This Encounter   Procedures     EXAMEGD       PHYSICAL EXAMINATION:  Vital Signs: /63  Pulse 75  Temp 98.2  F  "(36.8  C) (Oral)  Ht 1.6 m (5' 3\")  Wt 80.5 kg (177 lb 6.4 oz)  SpO2 100%  BMI 31.42 kg/m2  HEENT: NCAT; MMM;   Lungs: Breathing unlabored  Abdomen: distended, soft, non-tender   MSK: moves all extremities   Neuro: Alert and appropriate     IMAGING:  CT scan reviewed 2/9/18  Impression:   1. Stable small hiatal hernia.  2. Stable-appearing horseshoe kidney.       ASSESSMENT:  Patient is 42 year old with history of GERD and smoking here for reflux, epigastric abdominal pain, and belching. CT demonstrates a small hiatal hernia. Patient is on omeprazole for reflux symptoms. Further workup of symptoms is needed prior to repair of hernia to rule out other conditions like ulcers and bleeding.     PLAN:  - UGI w/ Bravo PH study   - Stop PPI 7 days prior to procedure   - Follow-up post-procedure      Sincerely,    Scribe Disclosure:   I, Won Chew, am serving as a scribe; to document services personally performed by Dr. Baldwin- -based on data collection and the provider's statements to me.     Provider Disclosure:  I agree with above History, Review of Systems, Physical exam and Plan.  I have reviewed the content of the documentation and have edited it as needed. I have personally performed the services documented here and the documentation accurately represents those services and the decisions I have made.      Electronically signed by:    Saran Baldwin MD  Surgery  [phone] (hospital )  [phone] (clinic nurses)                    "

## 2018-02-22 NOTE — NURSING NOTE
"Chief Complaint   Patient presents with     Consult     Hiatal hernia       Vitals:    02/22/18 1002   BP: 100/63   Pulse: 75   Temp: 98.2  F (36.8  C)   TempSrc: Oral   SpO2: 100%   Weight: 177 lb 6.4 oz   Height: 5' 3\"       Body mass index is 31.42 kg/(m^2).    Roderick Baum CMA                          "

## 2018-02-22 NOTE — LETTER
2/22/2018       RE: Pili Du  [address] [facility] [address]     Dear Colleague,    Thank you for referring your patient, Pili Du, to the [facility] GENERAL SURGERY at [facility]. Please see a copy of my visit note below.    New Hernia Consultation Note      Pili Du  [id number]  1975    Requesting Provider: Delmer Villaseñor    Dear Clinic, Tito Berrios,    I was asked by Delmer Villaseñor to see this patient for the following problem:    CHIEF COMPLAINT: Reflux and small hiatal Hernia     HISTORY OF PRESENT ILLNESS:  Location: hiatal hernia     Patient is a 42 year old with history of GERD who presents to clinic for reflux symptoms. Patient reports she has been having significant reflux for a couple years, however, the past few months she started having epigastric pain. She also reports a feeling of heavy chest, belching, bloating, and gas. Pain is worse after meals, and she has resorted to self-induced vomiting to relieve symptoms. Patient did have a cardiac workup including CT (2/9/18) and EKG which was negative, except for a small hiatal hernia. H-pylori testing was also negative. Patient was an everyday smoker, however, she reports that she quit a couple of months ago, and currently only has occasional hookah use. Patient is on omeprazole and reports some relief with reflux symptoms using it. Patient had a cholecystectomy done in 2012.       NUTRITIONAL STATUS:  Lab Results   Component Value Date    ALBUMIN 3.4 12/12/2017       Body mass index is 31.42 kg/(m^2).    Patient is not immunosuppressed.    Patient is a current smoker.    Past Medical History:   Diagnosis Date     Abnormal Pap smear     2011 - resolved     Other specified anemias     takes iron     Unspecified hypothyroidism     takes synthroid       Patient Active Problem List   Diagnosis     Endometriosis of other specified sites     Status post induction of labor      Right sided abdominal pain     Vomiting     Nausea and vomiting in pregnancy     Decreased fetal movement     Indication for care in labor or delivery     Dizziness     IUGR (intrauterine growth restriction)     Normal spontaneous vaginal delivery     Sepsis (H)     Anemia     Fatigue     Thyroid activity decreased     Vitamin D deficiency     Thiamin deficiency     Acquired hypothyroidism     Vitamin B12 deficiency without anemia     Pyridoxine deficiency     Iron deficiency anemia     Incomplete      Amenorrhea     Chronic pelvic pain in female     Female infertility     Gastroesophageal reflux disease without esophagitis     Reflux esophagitis     Encounter for supervision of other normal pregnancy       Past Surgical History:   Procedure Laterality Date     LAPAROSCOPIC CHOLECYSTECTOMY  10/1/2012    Procedure: LAPAROSCOPIC CHOLECYSTECTOMY;  LAPAROSCOPIC CHOLECYSTECTOMY ;  Surgeon: Liyah Lowry MD;  Location:  OR       MEDICATIONS:  Current Outpatient Prescriptions   Medication     LANsoprazole (PREVACID) 30 MG CR capsule     cholecalciferol 5000 UNITS CAPS     vitamin C-electrolytes (EMERGEN-C) 1000mg vitamin C super orange drink mix     ferrous sulfate Dried 140 (45 FE) MG TBCR     Levothyroxine Sodium (SYNTHROID PO)     ibuprofen (ADVIL/MOTRIN) 200 MG tablet     No current facility-administered medications for this visit.        ALLERGIES:  Allergies   Allergen Reactions     Kenyon Aspirin Swelling     Patient reports allergy to ASPIRIN       Social History     Social History     Marital status:      Spouse name: N/A     Number of children: N/A     Years of education: N/A     Social History Main Topics     Smoking status: History of everyday smoking, quit 2016, now occasional hookah use     Smokeless tobacco: Never Used     Alcohol use No     Drug use: No     Sexual activity: Yes     Partners: Male     Other Topics Concern     None     Social History Narrative       Family History  "  Problem Relation Age of Onset     Family History Negative No family hx of        ROS  A 10 point ROS was negative unless otherwise stated in HPI.     Orders Placed This Encounter   Procedures     EXAMEGD       PHYSICAL EXAMINATION:  Vital Signs: /63  Pulse 75  Temp 98.2  F (36.8  C) (Oral)  Ht 1.6 m (5' 3\")  Wt 80.5 kg (177 lb 6.4 oz)  SpO2 100%  BMI 31.42 kg/m2  HEENT: NCAT; MMM;   Lungs: Breathing unlabored  Abdomen: distended, soft, non-tender   MSK: moves all extremities   Neuro: Alert and appropriate     IMAGING:  CT scan reviewed 2/9/18  Impression:   1. Stable small hiatal hernia.  2. Stable-appearing horseshoe kidney.       ASSESSMENT:  Patient is 42 year old with history of GERD and smoking here for reflux, epigastric abdominal pain, and belching. CT demonstrates a small hiatal hernia. Patient is on omeprazole for reflux symptoms. Further workup of symptoms is needed prior to repair of hernia to rule out other conditions like ulcers and bleeding.     PLAN:  - UGI w/ Bravo PH study   - Stop PPI 7 days prior to procedure   - Follow-up post-procedure      Sincerely,    Scribe Disclosure:   I, Won Chew, am serving as a scribe; to document services personally performed by Dr. Baldwin- -based on data collection and the provider's statements to me.     Provider Disclosure:  I agree with above History, Review of Systems, Physical exam and Plan.  I have reviewed the content of the documentation and have edited it as needed. I have personally performed the services documented here and the documentation accurately represents those services and the decisions I have made.      Electronically signed by:    Saran Baldwin MD  Surgery  [phone] (hospital )  [phone] (clinic nurses)    "

## 2018-02-22 NOTE — PATIENT INSTRUCTIONS
It was a pleasure meeting with you today.     Thank you for allowing us the privilege of caring for you. We hope we provided you with the excellent service you deserve.     Please let us know if there is anything else we can do for you so that we can be sure you are leaving completely satisfied with your care experience.      You saw Dr Baldwin today.     Instructions per today's visit:     Needs to be off acid blockers 7 days prior to procedure    Endoscopy Instructions:    -Call the Endoscopy Department at [phone] to schedule an endoscopy to be done by your surgeon.  Date:___________  Time:___________    -Have nothing to eat or drink for 6 hours prior to the check-in time.   -Check-in one hour before the procedure time.  -Please bring a  to drive you home after the procedure.  -You cannot drive for 24 hours after the procedure due to the sedation.  -The Endoscopy Department is located on the 1st floor of the [facility].  -The [facility] is at [address] [place] [address].    -Ph: [phone] for scheduling, directions or questions.  - parking at the front door is available for the same price as parking in the [employer] visitors ramp.        To schedule appointments with our team, please call [phone] option #1    Please call during clinic hours Monday through Friday 8:00a - 4:00p if you have questions or you can contact us via [employer] at anytime.      Nurses: [phone] Option # 3 for nurse advice line.  Fax: [phone]  Surgery Scheduler: [phone]    Please call the hospital at [phone] to speak with our on call MDs if you have urgent needs after hours, during weekends, or holidays.

## 2018-03-08 ENCOUNTER — TRANSFERRED RECORDS (OUTPATIENT)
Dept: HEALTH INFORMATION MANAGEMENT | Facility: CLINIC | Age: 43
End: 2018-03-08

## 2018-03-09 ENCOUNTER — TELEPHONE (OUTPATIENT)
Dept: GASTROENTEROLOGY | Facility: CLINIC | Age: 43
End: 2018-03-09

## 2018-03-09 NOTE — TELEPHONE ENCOUNTER
Patient scheduled for EGD with Bravo placement    Indication for procedure. Acid reflux, off PPI for 2 weeks    Referring Provider. Dr. Eller    ? no    Arrival time verified? Yes, 12:45    Facility location verified? [address] [place] [address] 1-301    Instructions given regarding prep and procedure    Prep Type npo  Romy Akers RN      Are you taking any anticoagulants or blood thinners? no    Instructions given? Yes, verbally and written    Electronic implanted devices? no    Pre procedure teaching completed? Yes    Transportation from procedure? Yes,     H&P / Pre op physical completed? na

## 2018-03-14 ENCOUNTER — HOSPITAL ENCOUNTER (OUTPATIENT)
Facility: CLINIC | Age: 43
Discharge: HOME | End: 2018-03-14
Attending: SURGERY | Admitting: SURGERY
Payer: COMMERCIAL

## 2018-03-14 ENCOUNTER — SURGERY (OUTPATIENT)
Age: 43
End: 2018-03-14

## 2018-03-14 VITALS
BODY MASS INDEX: 30.48 KG/M2 | RESPIRATION RATE: 15 BRPM | HEIGHT: 63 IN | OXYGEN SATURATION: 98 % | WEIGHT: 172 LBS | SYSTOLIC BLOOD PRESSURE: 101 MMHG | DIASTOLIC BLOOD PRESSURE: 79 MMHG

## 2018-03-14 LAB — UPPER GI ENDOSCOPY: NORMAL

## 2018-03-14 PROCEDURE — 88305 TISSUE EXAM BY PATHOLOGIST: CPT | Performed by: SURGERY

## 2018-03-14 PROCEDURE — 25000128 H RX IP 250 OP 636: Performed by: SURGERY

## 2018-03-14 PROCEDURE — 91035 G-ESOPH REFLX TST W/ELECTROD: CPT | Performed by: SURGERY

## 2018-03-14 PROCEDURE — 25000125 ZZHC RX 250: Performed by: SURGERY

## 2018-03-14 PROCEDURE — G0500 MOD SEDAT ENDO SERVICE >5YRS: HCPCS | Performed by: SURGERY

## 2018-03-14 PROCEDURE — 43239 EGD BIOPSY SINGLE/MULTIPLE: CPT | Performed by: SURGERY

## 2018-03-14 PROCEDURE — 40000141 ZZH STATISTIC PERIPHERAL IV START W/O US GUIDANCE

## 2018-03-14 RX ORDER — ONDANSETRON 2 MG/ML
4 INJECTION INTRAMUSCULAR; INTRAVENOUS
Status: DISCONTINUED | OUTPATIENT
Start: 2018-03-14 | End: 2018-03-14 | Stop reason: HOSPADM

## 2018-03-14 RX ORDER — FENTANYL CITRATE 50 UG/ML
INJECTION, SOLUTION INTRAMUSCULAR; INTRAVENOUS PRN
Status: DISCONTINUED | OUTPATIENT
Start: 2018-03-14 | End: 2018-03-14 | Stop reason: HOSPADM

## 2018-03-14 RX ADMIN — MIDAZOLAM 1 MG: 1 INJECTION INTRAMUSCULAR; INTRAVENOUS at 14:39

## 2018-03-14 RX ADMIN — MIDAZOLAM 1 MG: 1 INJECTION INTRAMUSCULAR; INTRAVENOUS at 14:29

## 2018-03-14 RX ADMIN — BENZOCAINE 2 SPRAY: 220 SPRAY, METERED PERIODONTAL at 14:26

## 2018-03-14 RX ADMIN — MIDAZOLAM 1 MG: 1 INJECTION INTRAMUSCULAR; INTRAVENOUS at 14:27

## 2018-03-14 RX ADMIN — FENTANYL CITRATE 100 MCG: 50 INJECTION, SOLUTION INTRAMUSCULAR; INTRAVENOUS at 14:24

## 2018-03-14 RX ADMIN — FENTANYL CITRATE 50 MCG: 50 INJECTION, SOLUTION INTRAMUSCULAR; INTRAVENOUS at 14:41

## 2018-03-14 RX ADMIN — MIDAZOLAM 1 MG: 1 INJECTION INTRAMUSCULAR; INTRAVENOUS at 14:24

## 2018-03-14 NOTE — IP AVS SNAPSHOT
MRN:[id number]                      After Visit Summary   3/14/2018    Pili Du    MRN: [id number]           Thank you!     Thank you for choosing [facility] for your care. Our goal is always to provide you with excellent care. Hearing back from our patients is one way we can continue to improve our services. Please take a few minutes to complete the written survey that you may receive in the mail after you visit with us. Thank you!        Patient Information     Date Of Birth          1975        About your hospital stay     You were admitted on:  March 14, 2018 You last received care in the:  [facility], Endoscopy    You were discharged on:  March 14, 2018       Who to Call     For medical emergencies, please call 911.  For non-urgent questions about your medical care, please call your primary care provider or clinic, [phone]  For questions related to your surgery, please call your surgery clinic        Attending Provider     Provider Specialty    Saran Baldwin MD Surgery       Primary Care Provider Office Phone # Fax #    [facility] [phone]      Further instructions from your care team       Discharge Instructions after  Upper Endoscopy (EGD)    Activity and Diet  You were given medicine for pain. You may be dizzy or sleepy.  For 24 hours:    Do not drive or use heavy equipment.    Do not make important decisions.    Do not drink any alcohol.  _x__ You may return to your regular diet.    Discomfort  You may have a sore throat for 2 to 3 days. It may help to:    Avoid hot liquids for 24 hours.    Use sore throat lozenges.    Gargle as needed with salt water up to 4 times a day. Mix 1 cup of warm water  with 1 teaspoon of salt. Do not swallow.    You may take Tylenol (acetaminophen) for pain unless your doctor has told you not to.        Follow-up  _x__ We took small tissue samples for study. If you do not have a follow-up visit  "scheduled,  call your provider s office in 2 weeks for the results.    Other instructions________________________________________________________    When to call us:  Problems are rare. Call right away if you have:    Unusual throat pain or trouble swallowing    Unusual pain in belly or chest that is not relieved by belching or passing air    Black stools (tar-like looking bowel movement)    Temperature above 100.6  F. (37.5  C).    If you vomit blood or have severe pain, go to an emergency room.    If you have questions, call:  Monday to Friday, 7 a.m. to 4:30 p.m.: Endoscopy: [phone] (We may have to call you back)    After hours: Hospital: [phone] (Ask for the GI fellow on call)    Pending Results     No orders found from 3/12/2018 to 3/15/2018.            Admission Information     Date & Time Provider Department Dept. Phone    3/14/2018 Saran Baldwin MD [facility] [place], Endoscopy [phone]      Your Vitals Were     Blood Pressure Respirations Height Weight Pulse Oximetry BMI (Body Mass Index)    107/72 15 1.6 m (5' 3\") 78 kg (172 lb) 96% 30.47 kg/m2      Care EveryWhere ID     This is your Care EveryWhere ID. This could be used by other organizations to access your [facility] medical records  [id number]        Equal Access to Services     TALA ALMODOVAR : Hadzain colemano Socallie, waaxda luqadaha, qaybta kaalmada adeegyajonathan, mercedes addison . So [facility] [phone].    ATENCIÓN: Si habla español, tiene a blum disposición servicios gratuitos de asistencia lingüística. Llame al [phone].    We comply with applicable federal civil rights laws and [place] laws. We do not discriminate on the basis of race, color, national origin, age, disability, sex, sexual orientation, or gender identity.               Review of your medicines      UNREVIEWED medicines. Ask your doctor about these medicines        Dose / Directions    SYNTHROID PO        Dose:  150 mcg   Take 150 mcg " by mouth daily   Refills:  0                Protect others around you: Learn how to safely use, store and throw away your medicines at www.disposemymeds.org.             Medication List: This is a list of all your medications and when to take them. Check marks below indicate your daily home schedule. Keep this list as a reference.      Medications           Morning Afternoon Evening Bedtime As Needed    SYNTHROID PO   Take 150 mcg by mouth daily

## 2018-03-14 NOTE — OR NURSING
Pt had egd with biopsies of antrum and  ge junction. BRAVO capsule also placed at 31 cm for study without problems. VSS. Pt slept throughout procedure. Awake and asking questions after procedure. Instructions given to pt before procedure as to what to record, and will repeat instructions again before dc.

## 2018-03-14 NOTE — DISCHARGE INSTRUCTIONS
Discharge Instructions after  Upper Endoscopy (EGD)    Activity and Diet  You were given medicine for pain. You may be dizzy or sleepy.  For 24 hours:    Do not drive or use heavy equipment.    Do not make important decisions.    Do not drink any alcohol.  _x__ You may return to your regular diet.    Discomfort  You may have a sore throat for 2 to 3 days. It may help to:    Avoid hot liquids for 24 hours.    Use sore throat lozenges.    Gargle as needed with salt water up to 4 times a day. Mix 1 cup of warm water  with 1 teaspoon of salt. Do not swallow.    You may take Tylenol (acetaminophen) for pain unless your doctor has told you not to.        Follow-up  _x__ We took small tissue samples for study. If you do not have a follow-up visit scheduled,  call your provider s office in 2 weeks for the results.    Other instructions________________________________________________________    When to call us:  Problems are rare. Call right away if you have:    Unusual throat pain or trouble swallowing    Unusual pain in belly or chest that is not relieved by belching or passing air    Black stools (tar-like looking bowel movement)    Temperature above 100.6  F. (37.5  C).    If you vomit blood or have severe pain, go to an emergency room.    If you have questions, call:  Monday to Friday, 7 a.m. to 4:30 p.m.: Endoscopy: [phone] (We may have to call you back)    After hours: Hospital: [phone] (Ask for the GI fellow on call)

## 2018-03-14 NOTE — IP AVS SNAPSHOT
[facility], [place], Endoscopy    [address] [address]    Phone:  [phone]                                       After Visit Summary   3/14/2018    Pili Du    MRN: [id number]           After Visit Summary Signature Page     I have received my discharge instructions, and my questions have been answered. I have discussed any challenges I see with this plan with the nurse or doctor.    ..........................................................................................................................................  Patient/Patient Representative Signature      ..........................................................................................................................................  Patient Representative Print Name and Relationship to Patient    ..................................................               ................................................  Date                                            Time    ..........................................................................................................................................  Reviewed by Signature/Title    ...................................................              ..............................................  Date                                                            Time

## 2018-03-15 LAB — COPATH REPORT: NORMAL

## 2018-03-22 ENCOUNTER — TRANSFERRED RECORDS (OUTPATIENT)
Dept: HEALTH INFORMATION MANAGEMENT | Facility: CLINIC | Age: 43
End: 2018-03-22

## 2018-03-29 PROBLEM — K44.9 HIATAL HERNIA: Status: ACTIVE | Noted: 2018-03-29

## 2019-09-12 ENCOUNTER — OFFICE VISIT (OUTPATIENT)
Dept: SURGERY | Facility: CLINIC | Age: 44
End: 2019-09-12
Payer: COMMERCIAL

## 2019-09-12 VITALS
SYSTOLIC BLOOD PRESSURE: 110 MMHG | DIASTOLIC BLOOD PRESSURE: 60 MMHG | WEIGHT: 170 LBS | HEART RATE: 91 BPM | HEIGHT: 64 IN | BODY MASS INDEX: 29.02 KG/M2

## 2019-09-12 DIAGNOSIS — K21.00 GASTROESOPHAGEAL REFLUX DISEASE WITH ESOPHAGITIS: Primary | ICD-10-CM

## 2019-09-12 PROCEDURE — 99244 OFF/OP CNSLTJ NEW/EST MOD 40: CPT | Performed by: SURGERY

## 2019-09-12 ASSESSMENT — MIFFLIN-ST. JEOR: SCORE: 1416.11

## 2019-09-12 NOTE — PROGRESS NOTES
"[facility]  Surgery Consultation    PCP:  Florian Davila [phone]    HPI: Patient is a 44-year-old female referred by the above-mentioned provider for consultation regarding hiatal hernia and gastroesophageal reflux disease.  She has a several  years history of described acid reflux symptoms.  She has been taking a proton pump for some time.  She describes episodic epigastric abdominal pain particularly after meals.  She also has nausea and occasional vomiting.  Her symptoms have worsened over time.  She also describes some issues with shortness of breath particularly after eating.  She has undergone evaluation for this in the past including imaging, endoscopy as well as Bravo pH study all of which have been somewhat nondiagnostic.  Her symptoms are not presently controlled with proton pump inhibitor and overall feels that these are worsening over time.    PMH:   has a past medical history of Abnormal Pap smear, Other specified anemias, and Unspecified hypothyroidism.  PSH:    has a past surgical history that includes Laparoscopic cholecystectomy (10/1/2012) and Esophagoscopy, gastroscopy, duodenoscopy (EGD), combined (N/A, 3/14/2018).  Social History:   reports that she has never smoked. She has never used smokeless tobacco. She reports that she does not drink alcohol or use drugs.  Family History:   family history is not on file.  Medications/Allergies: Home medications and allergies reviewed.    ROS:  The 10 point Review of Systems is negative other than noted in the HPI.    Physical Exam:  /60   Pulse 91   Ht 1.626 m (5' 4\")   Wt 77.1 kg (170 lb)   BMI 29.18 kg/m    GENERAL: Generally appears well.  Psych: Alert and Oriented.  Normal affect  Eyes: Sclera clear  Respiratory:  Lungs clear to ausculation bilaterally with good air excursion  Cardiovascular:  Regular Rate and Rhythm with no murmurs gallops or rubs, normal peripheral pulses  GI: Abdomen Non Distended Mild tenderness " to palpation epigastric No hernias palpated..  Lymphatic/Hematologic/Immune:  No femoral or cervical lymphadenopathy.  Integumentary:  No rashes  Neurological: grossly intact       All new lab and imaging data was reviewed.  I reviewed the results of her pH probe study as well as document surrounding her upper endoscopy    Impression and Plan:  Patient is a 44 year old female with epigastric abdominal pain and other symptoms suggestive for acid reflux and symptomatic hiatal hernia.  That said diagnostic evaluation to present has been nondiagnostic and noncontributory to this diagnosis.    PLAN: This was all discussed with the patient.  At present I am not certain that there is clear indication that hiatal hernia repair and fundoplication will be helpful for this patient.  To complete her evaluation I am going to send her for a barium upper GI swallow study.  If this also comes back normal without substantial evidence to suggest reflux or esophageal issues I may send the patient to the esophageal clinic through [facility].  This was discussed with the patient and her family member present.  Questions were answered.  They are comfortable with this plan.      Thank you very much for this consult.    Huy Reyes M.D.  [facility]  [phone]    Please route or send letter to:  Primary Care Provider (PCP) and Referring Provider

## 2019-09-13 ENCOUNTER — HOSPITAL ENCOUNTER (OUTPATIENT)
Dept: GENERAL RADIOLOGY | Facility: CLINIC | Age: 44
Discharge: HOME | End: 2019-09-13
Attending: SURGERY | Admitting: SURGERY
Payer: COMMERCIAL

## 2019-09-13 DIAGNOSIS — K21.00 GASTROESOPHAGEAL REFLUX DISEASE WITH ESOPHAGITIS: ICD-10-CM

## 2019-09-13 PROCEDURE — 74240 X-RAY XM UPR GI TRC 1CNTRST: CPT

## 2019-09-23 ENCOUNTER — OFFICE VISIT (OUTPATIENT)
Dept: SURGERY | Facility: CLINIC | Age: 44
End: 2019-09-23
Payer: COMMERCIAL

## 2019-09-23 DIAGNOSIS — K44.9 HIATAL HERNIA: Primary | ICD-10-CM

## 2019-09-23 PROCEDURE — 99213 OFFICE O/P EST LOW 20 MIN: CPT | Performed by: SURGERY

## 2019-09-23 NOTE — PROGRESS NOTES
Patient presents in follow-up after recent office visit for consultation regarding hiatal hernia.  There have been no changes in her condition.  She continues to report substernal chest discomfort as well as some epigastric abdominal pain.  She has had no nausea or vomiting.  She went for barium upper GI.    We discussed the outcome and results of her study.  Is again demonstrated a small sliding hiatal hernia.  There was no significant reflux.  There was normal esophageal motility.    I discussed her options.  At this time I am a little hesitant to proceed with surgery.  I would like some corroboration before proceeding to the operating room.  I am going to send her to see Dr. Vijay Nogueira with [facility] in their esophageal clinic.  Pending his evaluation additional recommendations may follow.  Total time spent was 15 minutes with greater than 50% in face-to-face consultation.    Please route or send letter to:  Primary Care Provider (PCP) and Referring Provider

## 2019-10-14 ENCOUNTER — TRANSFERRED RECORDS (OUTPATIENT)
Dept: HEALTH INFORMATION MANAGEMENT | Facility: CLINIC | Age: 44
End: 2019-10-14

## 2020-03-21 ENCOUNTER — HOSPITAL ENCOUNTER (EMERGENCY)
Facility: CLINIC | Age: 45
Discharge: HOME | End: 2020-03-21
Attending: EMERGENCY MEDICINE | Admitting: EMERGENCY MEDICINE
Payer: COMMERCIAL

## 2020-03-21 VITALS
SYSTOLIC BLOOD PRESSURE: 132 MMHG | OXYGEN SATURATION: 93 % | DIASTOLIC BLOOD PRESSURE: 90 MMHG | TEMPERATURE: 98.1 F | RESPIRATION RATE: 18 BRPM | HEART RATE: 85 BPM

## 2020-03-21 DIAGNOSIS — Z20.1 EXPOSURE TO TB: ICD-10-CM

## 2020-03-21 PROCEDURE — 86481 TB AG RESPONSE T-CELL SUSP: CPT | Performed by: EMERGENCY MEDICINE

## 2020-03-21 PROCEDURE — 36415 COLL VENOUS BLD VENIPUNCTURE: CPT | Performed by: EMERGENCY MEDICINE

## 2020-03-21 PROCEDURE — 99283 EMERGENCY DEPT VISIT LOW MDM: CPT

## 2020-03-21 ASSESSMENT — ENCOUNTER SYMPTOMS
FEVER: 0
COUGH: 0

## 2020-03-21 NOTE — ED TRIAGE NOTES
Pt reports that she and her 3 children were exposed to her cousin who has active tb. The patient's cousin was staying with them in her house for the past 2 weeks. Pt made the cousin leave their house 2 days ago once she found out about the active tb. Pt denies any sx. VSS. A&O x3.

## 2020-03-21 NOTE — ED AVS SNAPSHOT
[facility] Emergency Department  [address]  [facility] [address]  Phone:  [phone]  Fax:  [phone]                                    Pili Du   MRN: [id number]    Department:  [facility] Emergency Department   Date of Visit:  3/21/2020           After Visit Summary Signature Page    I have received my discharge instructions, and my questions have been answered. I have discussed any challenges I see with this plan with the nurse or doctor.    ..........................................................................................................................................  Patient/Patient Representative Signature      ..........................................................................................................................................  Patient Representative Print Name and Relationship to Patient    ..................................................               ................................................  Date                                   Time    ..........................................................................................................................................  Reviewed by Signature/Title    ...................................................              ..............................................  Date                                               Time          22EPIC Rev 08/18

## 2020-03-21 NOTE — ED PROVIDER NOTES
History     Chief Complaint:  PPD Reading    HPI  Pili Du is a 44 year old female who presents after being referred from [facility] to the ED. She reports that a cousin (Nirmal Rincon 1/[birth year]) was staying with the patient's family for 2 weeks. She states that he was coughing a lot, was very diaphoretic and taking many showers stating that he was 'too hot' throughout his stay. She states that he attributed his symptoms to his diabetes. The patient was able to get him to admit that he had been diagnosed with TB in [place] and given medications. She believes that he has not been taking this medication. She states that he is now staying with a different family member, Suzan Huang ([phone]). The patient presents with all family members from her household. She denies any current symptoms including cough, fever. She states that she is fully immunized.     Allergies:  No Known Allergies     Medications:    Levothyroxine   Omeprazole   Zantac     Past Medical History:    Hypothyroidism   GERD   Anemia   Sepsis     Past Surgical History:    Cholecystectomy     Family History:    No past pertinent family history.    Social History:  The patient was accompanied to the ED by family.  Smoking Status: Never Smoker  Smokeless Tobacco: Never Used  Alcohol Use: Positive  Marital Status:        Review of Systems   Constitutional: Negative for fever.   Respiratory: Negative for cough.    All other systems reviewed and are negative.        Physical Exam     Patient Vitals for the past 24 hrs:   BP Temp Temp src Pulse Heart Rate Resp SpO2   03/21/20 1416 (!) 132/90 98.1  F (36.7  C) Oral 85 85 18 93 %       Physical Exam  Nursing note and vitals reviewed.    Constitutional: Pleasant and well groomed. Well appearing.          HENT:    Eyes: No scleral icterus.   Cardiovascular: Peripheral pulse with normal rate, regular rhythm. Intact distal pulses.   Pulmonary/Chest: Effort normal    Neurological:Alert and answering  questions appropriately. Coordination normal.   Skin: Skin is warm and dry.   Psychiatric: Normal mood and affect.     Emergency Department Course     Laboratory:  Laboratory findings were communicated with the patient who voiced understanding of the findings.    Quantiferon TB Gold Plus: pending     Emergency Department Course:     Nursing notes and vitals reviewed.    1419 I performed an exam of the patient as documented above.     1452 I spoke with the [facility] regarding patient's presentation, findings, and plan of care.     1637 I personally reviewed the laboratory results with the patient and answered all related questions prior to discharge.    Impression & Plan      Medical Decision Making:  Pili Du is a 44 year old female who presents to the emergency department today for evaluation of likely TB exposure. After consulting with [place] [facility] they recommended Quantiferon TB Gold Plus testing today and then again in 8 weeks unless they develop symptoms in which they should follow up sooner. I did contact [facility] where the patient's children get their care. However, it is not clear that they actually go there it may actually be [facility] so I will also contact them. They understand to follow up if they develop concerning symptoms otherwise to receive the appropriate testing.       Diagnosis:    ICD-10-CM    1. Exposure to TB  Z20.1      Disposition:   Findings and plan explained to the Patient. Patient discharged home with instructions regarding supportive care, medications, and reasons to return. The importance of close follow-up was reviewed.     Scribe Disclosure:  I, Farhana Sean, am serving as a scribe at 2:13 PM on 3/21/2020 to document services personally performed by Annabelle Johnson MD based on my observations and the provider's statements to me.    [facility] EMERGENCY DEPARTMENT       Annabelle Johnson MD  03/21/20 3639

## 2020-03-23 LAB
GAMMA INTERFERON BACKGROUND BLD IA-ACNC: 0.04 IU/ML
M TB IFN-G BLD-IMP: NEGATIVE
M TB IFN-G CD4+ BCKGRND COR BLD-ACNC: >10 IU/ML
MITOGEN IGNF BCKGRD COR BLD-ACNC: 0.01 IU/ML
MITOGEN IGNF BCKGRD COR BLD-ACNC: 0.01 IU/ML

## (undated) RX ORDER — DIPHENHYDRAMINE HYDROCHLORIDE 50 MG/ML
INJECTION INTRAMUSCULAR; INTRAVENOUS
Status: DISPENSED
Start: 2018-03-14

## (undated) RX ORDER — SIMETHICONE 20 MG/.3ML
EMULSION ORAL
Status: DISPENSED
Start: 2018-03-14

## (undated) RX ORDER — FENTANYL CITRATE 50 UG/ML
INJECTION, SOLUTION INTRAMUSCULAR; INTRAVENOUS
Status: DISPENSED
Start: 2018-03-14